# Patient Record
Sex: MALE | Race: WHITE | Employment: FULL TIME | URBAN - METROPOLITAN AREA
[De-identification: names, ages, dates, MRNs, and addresses within clinical notes are randomized per-mention and may not be internally consistent; named-entity substitution may affect disease eponyms.]

---

## 2017-06-23 ENCOUNTER — HOSPITAL ENCOUNTER (OUTPATIENT)
Dept: GENERAL RADIOLOGY | Age: 42
Discharge: HOME | End: 2017-06-23
Attending: INTERNAL MEDICINE
Payer: COMMERCIAL

## 2017-06-23 VITALS — BODY MASS INDEX: 33.64 KG/M2 | HEIGHT: 70 IN | WEIGHT: 235 LBS

## 2017-06-23 DIAGNOSIS — K63.3 ULCER OF INTESTINE: ICD-10-CM

## 2017-06-23 PROCEDURE — 74250 X-RAY XM SM INT 1CNTRST STD: CPT

## 2017-06-23 PROCEDURE — 74011000255 HC RX REV CODE- 255: Performed by: INTERNAL MEDICINE

## 2017-06-23 RX ADMIN — BARIUM SULFATE 1200 ML: 240 SUSPENSION ORAL at 11:22

## 2021-04-14 ENCOUNTER — HOSPITAL ENCOUNTER (OUTPATIENT)
Dept: LAB | Age: 46
Discharge: HOME | End: 2021-04-14

## 2021-04-14 PROCEDURE — 88305 TISSUE EXAM BY PATHOLOGIST: CPT

## 2022-11-17 RX ORDER — OMEPRAZOLE 40 MG/1
40 CAPSULE, DELAYED RELEASE ORAL DAILY
COMMUNITY

## 2022-11-17 RX ORDER — SUCRALFATE 1 G/1
3 TABLET ORAL DAILY
COMMUNITY

## 2022-11-17 RX ORDER — CETIRIZINE HYDROCHLORIDE 10 MG/1
10 TABLET ORAL DAILY
COMMUNITY

## 2022-11-17 RX ORDER — VENLAFAXINE HYDROCHLORIDE 150 MG/1
150 CAPSULE, EXTENDED RELEASE ORAL NIGHTLY
COMMUNITY

## 2022-11-17 NOTE — PERIOP NOTE
Patient verified name and . Order for consent NOT found in EHR; patient verifies procedure. Type 1B surgery, phone assessment complete. Orders NOT received. Labs per surgeon: unknown; no orders received in EHR at time of assessment. Labs per anesthesia protocol: none. Patient answered medical/surgical history questions at their best of ability. All prior to admission medications documented in [facility]. Patient instructed to take the following medications the day of surgery according to anesthesia guidelines with a small sip of water: omeprazole, carafate, zyrtec. On the day before surgery please take Acetaminophen 1000mg in the morning and then again before bed. You may substitute for Tylenol 650 mg. Hold all vitamins 7 days prior to surgery and NSAIDS 5 days prior to surgery. Prescription meds to hold: none. Patient instructed on the following:    > Arrive at A Entrance, time of arrival to be called the day before by 1700  > NPO after midnight, unless otherwise indicated, including gum, mints, and ice chips  > Responsible adult must drive patient to the hospital, stay during surgery, and patient will need supervision 24 hours after anesthesia  > Use dial antibacterial soap in shower the night before surgery and on the morning of surgery  > All piercings must be removed prior to arrival.    > Leave all valuables (money and jewelry) at home but bring insurance card and ID on DOS.   > You may be required to pay a deductible or co-pay on the day of your procedure. You can pre-pay by calling [phone] if your surgery is at the [facility] or [phone] if your surgery is at the [facility]. > Do not wear make-up, nail polish, lotions, cologne, perfumes, powders, or oil on skin. Artificial nails are not permitted.

## 2022-11-27 ENCOUNTER — ANESTHESIA EVENT (OUTPATIENT)
Dept: SURGERY | Age: 47
End: 2022-11-27
Payer: COMMERCIAL

## 2022-11-28 ENCOUNTER — ANESTHESIA (OUTPATIENT)
Dept: SURGERY | Age: 47
End: 2022-11-28
Payer: COMMERCIAL

## 2022-11-28 ENCOUNTER — HOSPITAL ENCOUNTER (OUTPATIENT)
Age: 47
Setting detail: OUTPATIENT SURGERY
Discharge: HOME | End: 2022-11-28
Attending: OTOLARYNGOLOGY | Admitting: OTOLARYNGOLOGY
Payer: COMMERCIAL

## 2022-11-28 VITALS
SYSTOLIC BLOOD PRESSURE: 152 MMHG | HEIGHT: 70 IN | TEMPERATURE: 98.4 F | WEIGHT: 246 LBS | HEART RATE: 68 BPM | BODY MASS INDEX: 35.22 KG/M2 | DIASTOLIC BLOOD PRESSURE: 93 MMHG | RESPIRATION RATE: 14 BRPM | OXYGEN SATURATION: 98 %

## 2022-11-28 PROCEDURE — 3700000000 HC ANESTHESIA ATTENDED CARE: Performed by: OTOLARYNGOLOGY

## 2022-11-28 PROCEDURE — 7100000011 HC PHASE II RECOVERY - ADDTL 15 MIN: Performed by: OTOLARYNGOLOGY

## 2022-11-28 PROCEDURE — 6370000000 HC RX 637 (ALT 250 FOR IP): Performed by: ANESTHESIOLOGY

## 2022-11-28 PROCEDURE — 2500000003 HC RX 250 WO HCPCS: Performed by: NURSE ANESTHETIST, CERTIFIED REGISTERED

## 2022-11-28 PROCEDURE — 6360000002 HC RX W HCPCS: Performed by: NURSE ANESTHETIST, CERTIFIED REGISTERED

## 2022-11-28 PROCEDURE — 7100000010 HC PHASE II RECOVERY - FIRST 15 MIN: Performed by: OTOLARYNGOLOGY

## 2022-11-28 PROCEDURE — 2709999900 HC NON-CHARGEABLE SUPPLY: Performed by: OTOLARYNGOLOGY

## 2022-11-28 PROCEDURE — 6360000002 HC RX W HCPCS: Performed by: ANESTHESIOLOGY

## 2022-11-28 PROCEDURE — 6370000000 HC RX 637 (ALT 250 FOR IP): Performed by: OTOLARYNGOLOGY

## 2022-11-28 PROCEDURE — 2580000003 HC RX 258: Performed by: ANESTHESIOLOGY

## 2022-11-28 PROCEDURE — 7100000000 HC PACU RECOVERY - FIRST 15 MIN: Performed by: OTOLARYNGOLOGY

## 2022-11-28 PROCEDURE — 3600000004 HC SURGERY LEVEL 4 BASE: Performed by: OTOLARYNGOLOGY

## 2022-11-28 PROCEDURE — 3700000001 HC ADD 15 MINUTES (ANESTHESIA): Performed by: OTOLARYNGOLOGY

## 2022-11-28 PROCEDURE — 7100000001 HC PACU RECOVERY - ADDTL 15 MIN: Performed by: OTOLARYNGOLOGY

## 2022-11-28 PROCEDURE — 2500000003 HC RX 250 WO HCPCS: Performed by: OTOLARYNGOLOGY

## 2022-11-28 PROCEDURE — 3600000014 HC SURGERY LEVEL 4 ADDTL 15MIN: Performed by: OTOLARYNGOLOGY

## 2022-11-28 RX ORDER — OXYMETAZOLINE HYDROCHLORIDE 0.05 G/100ML
SPRAY NASAL PRN
Status: DISCONTINUED | OUTPATIENT
Start: 2022-11-28 | End: 2022-11-28 | Stop reason: HOSPADM

## 2022-11-28 RX ORDER — MIDAZOLAM HYDROCHLORIDE 2 MG/2ML
2 INJECTION, SOLUTION INTRAMUSCULAR; INTRAVENOUS
Status: COMPLETED | OUTPATIENT
Start: 2022-11-28 | End: 2022-11-28

## 2022-11-28 RX ORDER — ONDANSETRON 2 MG/ML
INJECTION INTRAMUSCULAR; INTRAVENOUS PRN
Status: DISCONTINUED | OUTPATIENT
Start: 2022-11-28 | End: 2022-11-28 | Stop reason: SDUPTHER

## 2022-11-28 RX ORDER — LIDOCAINE HYDROCHLORIDE 20 MG/ML
INJECTION, SOLUTION EPIDURAL; INFILTRATION; INTRACAUDAL; PERINEURAL PRN
Status: DISCONTINUED | OUTPATIENT
Start: 2022-11-28 | End: 2022-11-28 | Stop reason: SDUPTHER

## 2022-11-28 RX ORDER — ONDANSETRON 2 MG/ML
4 INJECTION INTRAMUSCULAR; INTRAVENOUS
Status: DISCONTINUED | OUTPATIENT
Start: 2022-11-28 | End: 2022-11-28 | Stop reason: HOSPADM

## 2022-11-28 RX ORDER — DEXAMETHASONE SODIUM PHOSPHATE 10 MG/ML
INJECTION INTRAMUSCULAR; INTRAVENOUS PRN
Status: DISCONTINUED | OUTPATIENT
Start: 2022-11-28 | End: 2022-11-28 | Stop reason: SDUPTHER

## 2022-11-28 RX ORDER — OXYCODONE HYDROCHLORIDE 5 MG/1
10 TABLET ORAL PRN
Status: COMPLETED | OUTPATIENT
Start: 2022-11-28 | End: 2022-11-28

## 2022-11-28 RX ORDER — SODIUM CHLORIDE 0.9 % (FLUSH) 0.9 %
5-40 SYRINGE (ML) INJECTION EVERY 12 HOURS SCHEDULED
Status: DISCONTINUED | OUTPATIENT
Start: 2022-11-28 | End: 2022-11-28 | Stop reason: HOSPADM

## 2022-11-28 RX ORDER — LIDOCAINE HYDROCHLORIDE 10 MG/ML
1 INJECTION, SOLUTION INFILTRATION; PERINEURAL
Status: DISCONTINUED | OUTPATIENT
Start: 2022-11-28 | End: 2022-11-28 | Stop reason: HOSPADM

## 2022-11-28 RX ORDER — LIDOCAINE HYDROCHLORIDE AND EPINEPHRINE 10; 10 MG/ML; UG/ML
INJECTION, SOLUTION INFILTRATION; PERINEURAL PRN
Status: DISCONTINUED | OUTPATIENT
Start: 2022-11-28 | End: 2022-11-28 | Stop reason: HOSPADM

## 2022-11-28 RX ORDER — PROCHLORPERAZINE EDISYLATE 5 MG/ML
5 INJECTION INTRAMUSCULAR; INTRAVENOUS
Status: DISCONTINUED | OUTPATIENT
Start: 2022-11-28 | End: 2022-11-28 | Stop reason: HOSPADM

## 2022-11-28 RX ORDER — HYDROMORPHONE HYDROCHLORIDE 1 MG/ML
0.25 INJECTION, SOLUTION INTRAMUSCULAR; INTRAVENOUS; SUBCUTANEOUS EVERY 5 MIN PRN
Status: DISCONTINUED | OUTPATIENT
Start: 2022-11-28 | End: 2022-11-28 | Stop reason: HOSPADM

## 2022-11-28 RX ORDER — ROCURONIUM BROMIDE 10 MG/ML
INJECTION, SOLUTION INTRAVENOUS PRN
Status: DISCONTINUED | OUTPATIENT
Start: 2022-11-28 | End: 2022-11-28 | Stop reason: SDUPTHER

## 2022-11-28 RX ORDER — HYDROMORPHONE HYDROCHLORIDE 1 MG/ML
0.5 INJECTION, SOLUTION INTRAMUSCULAR; INTRAVENOUS; SUBCUTANEOUS EVERY 5 MIN PRN
Status: DISCONTINUED | OUTPATIENT
Start: 2022-11-28 | End: 2022-11-28 | Stop reason: HOSPADM

## 2022-11-28 RX ORDER — SODIUM CHLORIDE 9 MG/ML
INJECTION, SOLUTION INTRAVENOUS PRN
Status: DISCONTINUED | OUTPATIENT
Start: 2022-11-28 | End: 2022-11-28 | Stop reason: HOSPADM

## 2022-11-28 RX ORDER — ACETAMINOPHEN 500 MG
1000 TABLET ORAL ONCE
Status: COMPLETED | OUTPATIENT
Start: 2022-11-28 | End: 2022-11-28

## 2022-11-28 RX ORDER — SODIUM CHLORIDE 0.9 % (FLUSH) 0.9 %
5-40 SYRINGE (ML) INJECTION PRN
Status: DISCONTINUED | OUTPATIENT
Start: 2022-11-28 | End: 2022-11-28 | Stop reason: HOSPADM

## 2022-11-28 RX ORDER — DIPHENHYDRAMINE HYDROCHLORIDE 50 MG/ML
12.5 INJECTION INTRAMUSCULAR; INTRAVENOUS
Status: DISCONTINUED | OUTPATIENT
Start: 2022-11-28 | End: 2022-11-28 | Stop reason: HOSPADM

## 2022-11-28 RX ORDER — FENTANYL CITRATE 50 UG/ML
INJECTION, SOLUTION INTRAMUSCULAR; INTRAVENOUS PRN
Status: DISCONTINUED | OUTPATIENT
Start: 2022-11-28 | End: 2022-11-28 | Stop reason: SDUPTHER

## 2022-11-28 RX ORDER — PROPOFOL 10 MG/ML
INJECTION, EMULSION INTRAVENOUS PRN
Status: DISCONTINUED | OUTPATIENT
Start: 2022-11-28 | End: 2022-11-28 | Stop reason: SDUPTHER

## 2022-11-28 RX ORDER — OXYCODONE HYDROCHLORIDE 5 MG/1
5 TABLET ORAL PRN
Status: COMPLETED | OUTPATIENT
Start: 2022-11-28 | End: 2022-11-28

## 2022-11-28 RX ORDER — SUCCINYLCHOLINE CHLORIDE 20 MG/ML
INJECTION INTRAMUSCULAR; INTRAVENOUS PRN
Status: DISCONTINUED | OUTPATIENT
Start: 2022-11-28 | End: 2022-11-28 | Stop reason: SDUPTHER

## 2022-11-28 RX ORDER — SODIUM CHLORIDE, SODIUM LACTATE, POTASSIUM CHLORIDE, CALCIUM CHLORIDE 600; 310; 30; 20 MG/100ML; MG/100ML; MG/100ML; MG/100ML
INJECTION, SOLUTION INTRAVENOUS CONTINUOUS
Status: DISCONTINUED | OUTPATIENT
Start: 2022-11-28 | End: 2022-11-28 | Stop reason: HOSPADM

## 2022-11-28 RX ADMIN — OXYCODONE 10 MG: 5 TABLET ORAL at 16:33

## 2022-11-28 RX ADMIN — PROPOFOL 200 MG: 10 INJECTION, EMULSION INTRAVENOUS at 15:06

## 2022-11-28 RX ADMIN — FENTANYL CITRATE 25 MCG: 50 INJECTION, SOLUTION INTRAMUSCULAR; INTRAVENOUS at 15:23

## 2022-11-28 RX ADMIN — PROPOFOL 30 MG: 10 INJECTION, EMULSION INTRAVENOUS at 15:31

## 2022-11-28 RX ADMIN — FENTANYL CITRATE 50 MCG: 50 INJECTION, SOLUTION INTRAMUSCULAR; INTRAVENOUS at 15:06

## 2022-11-28 RX ADMIN — Medication 140 MG: at 15:06

## 2022-11-28 RX ADMIN — LIDOCAINE HYDROCHLORIDE 100 MG: 20 INJECTION, SOLUTION EPIDURAL; INFILTRATION; INTRACAUDAL; PERINEURAL at 15:06

## 2022-11-28 RX ADMIN — DEXAMETHASONE SODIUM PHOSPHATE 10 MG: 10 INJECTION INTRAMUSCULAR; INTRAVENOUS at 15:14

## 2022-11-28 RX ADMIN — ROCURONIUM BROMIDE 10 MG: 50 INJECTION, SOLUTION INTRAVENOUS at 15:06

## 2022-11-28 RX ADMIN — PROPOFOL 100 MG: 10 INJECTION, EMULSION INTRAVENOUS at 15:08

## 2022-11-28 RX ADMIN — MIDAZOLAM 2 MG: 1 INJECTION INTRAMUSCULAR; INTRAVENOUS at 13:57

## 2022-11-28 RX ADMIN — ONDANSETRON 4 MG: 2 INJECTION INTRAMUSCULAR; INTRAVENOUS at 15:14

## 2022-11-28 RX ADMIN — FENTANYL CITRATE 50 MCG: 50 INJECTION, SOLUTION INTRAMUSCULAR; INTRAVENOUS at 15:19

## 2022-11-28 RX ADMIN — SODIUM CHLORIDE, SODIUM LACTATE, POTASSIUM CHLORIDE, AND CALCIUM CHLORIDE 125 ML/HR: 600; 310; 30; 20 INJECTION, SOLUTION INTRAVENOUS at 13:41

## 2022-11-28 RX ADMIN — SODIUM CHLORIDE, SODIUM LACTATE, POTASSIUM CHLORIDE, AND CALCIUM CHLORIDE: 600; 310; 30; 20 INJECTION, SOLUTION INTRAVENOUS at 15:00

## 2022-11-28 RX ADMIN — ACETAMINOPHEN 1000 MG: 500 TABLET, FILM COATED ORAL at 13:42

## 2022-11-28 RX ADMIN — FENTANYL CITRATE 25 MCG: 50 INJECTION, SOLUTION INTRAMUSCULAR; INTRAVENOUS at 15:22

## 2022-11-28 RX ADMIN — FENTANYL CITRATE 50 MCG: 50 INJECTION, SOLUTION INTRAMUSCULAR; INTRAVENOUS at 15:14

## 2022-11-28 ASSESSMENT — PAIN SCALES - GENERAL
PAINLEVEL_OUTOF10: 0

## 2022-11-28 ASSESSMENT — PAIN - FUNCTIONAL ASSESSMENT: PAIN_FUNCTIONAL_ASSESSMENT: 0-10

## 2022-11-28 NOTE — DISCHARGE INSTRUCTIONS
Things to Remember After Surgery    1. Sleep and rest with head elevated on several pillows to decrease swelling and pressure. 2. You will have a 2 X 2 gauze under your nose to absorb the bloody discharge you will have the first 2 to 3 days after surgery. If you saturate the gauze more than 3 times in 30 minutes, please notify the office. 3. Numbness to the front teeth after nasal surgery is normal. The feeling usually returns in 6 to 8 weeks. 4. Clean the end of your nose with hydrogen peroxide. Do not try to clean inside. It may cause bleeding. 5. Do not blow your nose until you have had your first post-op appointment ( at least one week after surgery). 6. For a mild pain and a low-grade temperature, you may take Tylenol. No aspirin, Motrin, Advil, or Aleve for at least 3 weeks after nasal surgery. 7. If the nasal surgery requires an exterior cast and the cast falls off, please notify the office during office hours. 8. Nasal congestion after surgery is normal and will resolve after the splints and/or packing are removed. ACTIVITY   Bathe or shower as directed by your doctor. Avoid strenuous work and becoming overheated. Activity as directed by your doctor   Avoid bending over. DIET   Clear, cool liquids the first day; then soft diet the second day   Advance to regular diet on third day, unless your doctor orders otherwise. No milk products or foods with red color dyes   If nausea and vomiting continues, call your doctor. PAIN   Take pain medication as directed by your doctor. Call your doctor if pain is NOT relieved by medication. DO NOT take aspirin or blood thinners until directed by your doctor. FOLLOW-UP PHONE [address] Matt will be made by nursing staff   If you have any problems, call your doctor as needed. CALL YOUR DOCTOR IF   Bleeding is expected the first few days and should gradually clear.    Temperature of 10 1°F or above   Green or yellow discharge from nose   Stiff neck, changes in vision or mental status, confusion, or excessive drowsiness     Instructions Following Ambulatory Surgery    After Anesthesia   For the first 24 hours: DO NOT Drive, Drink alcoholic beverages, or Make important decisions   Be aware of dizziness following anesthesia and while taking pain medication

## 2022-11-28 NOTE — ANESTHESIA POSTPROCEDURE EVALUATION
Department of Anesthesiology  Postprocedure Note    Patient: Bernadette Santiago  MRN: [id number]  YOB: 1975  Date of evaluation: 11/28/2022      Procedure Summary     Date: 11/28/22 [place] [facility] [place] [facility] [place]    Anesthesia Start: 1500 Anesthesia Stop: 9036    Procedures:       SEPTOPLASTY (Nose)      BILATERAL SMRITS (Bilateral: Nose) Diagnosis:       Deviated nasal septum      Hypertrophy of nasal turbinates      (Deviated nasal septum [J34.2])      (Hypertrophy of nasal turbinates [J34.3])    Surgeons: Kody Rob DO Responsible Provider: Mark Arteaga DO    Anesthesia Type: general ASA Status: 3          Anesthesia Type: No value filed.     Selam Phase I:      Selam Phase II:        Anesthesia Post Evaluation    Patient location during evaluation: PACU  Level of consciousness: awake and alert  Airway patency: patent  Nausea & Vomiting: no nausea  Complications: no  Cardiovascular status: hemodynamically stable  Respiratory status: acceptable  Hydration status: euvolemic

## 2022-11-28 NOTE — ANESTHESIA PROCEDURE NOTES
Airway  Date/Time: 11/28/2022 3:20 PM  Urgency: elective    Airway not difficult    General Information and Staff    Patient location during procedure: OR  Anesthesiologist: Adriana Perez DO  Resident/CRNA: LATRICE Smith - CRNA    Indications and Patient Condition  Indications for airway management: anesthesia  Spontaneous Ventilation: absent  Sedation level: deep  Preoxygenated: yes  Patient position: sniffing  Mask difficulty assessment: vent by bag mask    Final Airway Details  Final airway type: endotracheal airway      Successful airway: ETT     Successful intubation technique: direct laryngoscopy  Endotracheal tube insertion site: oral  Blade: Janelle  Blade size: #3  ETT size (mm): 8.0  Cormack-Lehane Classification: grade IIa - partial view of glottis  Placement verified by: chest auscultation and capnometry   Measured from: lips  ETT to lips (cm): 24  Number of attempts at approach: 1

## 2022-11-28 NOTE — ANESTHESIA PRE PROCEDURE
Department of Anesthesiology  Preprocedure Note       Name:  Brandi Dennis   Age:  56 y.o.  :  1975                                          MRN:  [id number]         Date:  2022      Surgeon: Beckie Ortez):  Joanna Hutchins DO    Procedure: Procedure(s):  SEPTOPLASTY  BILATERAL SMRITS    Medications prior to admission:   Prior to Admission medications    Medication Sig Start Date End Date Taking? Authorizing Provider   venlafaxine (EFFEXOR XR) 150 MG extended release capsule Take 150 mg by mouth at bedtime   Yes Historical Provider, MD   cetirizine (ZYRTEC) 10 MG tablet Take 10 mg by mouth daily   Yes Historical Provider, MD   omeprazole (PRILOSEC) 40 MG delayed release capsule Take 40 mg by mouth daily   Yes Historical Provider, MD   sucralfate (CARAFATE) 1 GM tablet Take 3 g by mouth daily   Yes Historical Provider, MD       Current medications:    Current Facility-Administered Medications   Medication Dose Route Frequency Provider Last Rate Last Admin    lidocaine 1 % injection 1 mL  1 mL IntraDERmal Once PRN Missy Draper IV, MD        lactated ringers infusion   IntraVENous Continuous Missy Draper IV,  mL/hr at 22 1341 125 mL/hr at 22 1341    sodium chloride flush 0.9 % injection 5-40 mL  5-40 mL IntraVENous 2 times per day Missy Draper IV, MD        sodium chloride flush 0.9 % injection 5-40 mL  5-40 mL IntraVENous PRN Missy Draper IV, MD        0.9 % sodium chloride infusion   IntraVENous PRN Missy Draper IV, MD        midazolam PF (VERSED) injection 2 mg  2 mg IntraVENous Once PRN Missy Draper IV, MD           Allergies: Allergies   Allergen Reactions    Codeine Anaphylaxis       Problem List:  There is no problem list on file for this patient.       Past Medical History:        Diagnosis Date    BMI 33.0-33.9,adult     Depression     Fatty liver     Intestinal ulcer     ELIAS on CPAP        Past Surgical History:        Procedure Laterality Date    COLONOSCOPY      OTOPLASTY Bilateral     UMBILICAL HERNIA REPAIR         Social History:    Social History     Tobacco Use    Smoking status: Never    Smokeless tobacco: Never   Substance Use Topics    Alcohol use: No                                Counseling given: Not Answered      Vital Signs (Current):   Vitals:    11/17/22 1348 11/28/22 1300 11/28/22 1321   BP:  (!) 156/92    Pulse:  84    Resp:  17    Temp:  97.8 °F (36.6 °C)    TempSrc:  Temporal    SpO2:  98%    Weight: 230 lb (104.3 kg) 246 lb (111.6 kg)    Height: 5' 10\" (1.778 m)  5' 10\" (1.778 m)                                              BP Readings from Last 3 Encounters:   11/28/22 (!) 156/92       NPO Status: Time of last liquid consumption: 0745                        Time of last solid consumption: 2200                        Date of last liquid consumption: 11/28/22                        Date of last solid food consumption: 11/27/22    BMI:   Wt Readings from Last 3 Encounters:   11/28/22 246 lb (111.6 kg)     Body mass index is 35.3 kg/m². CBC: No results found for: WBC, RBC, HGB, HCT, MCV, RDW, PLT    CMP: No results found for: NA, K, CL, CO2, BUN, CREATININE, GFRAA, AGRATIO, LABGLOM, GLUCOSE, GLU, PROT, CALCIUM, BILITOT, ALKPHOS, AST, ALT    POC Tests: No results for input(s): POCGLU, POCNA, POCK, POCCL, POCBUN, POCHEMO, POCHCT in the last 72 hours.     Coags: No results found for: PROTIME, INR, APTT    HCG (If Applicable): No results found for: PREGTESTUR, PREGSERUM, HCG, HCGQUANT     ABGs: No results found for: PHART, PO2ART, LPN5CRE, BOO8PIU, BEART, E4SFEGTT     Type & Screen (If Applicable):  No results found for: LABABO, LABRH    Drug/Infectious Status (If Applicable):  No results found for: HIV, HEPCAB    COVID-19 Screening (If Applicable): No results found for: COVID19        Anesthesia Evaluation  Patient summary reviewed and Nursing notes reviewed  Airway: Mallampati: II          Dental: normal exam         Pulmonary:normal exam  breath sounds clear to auscultation  (+) sleep apnea: on noncompliant,                             Cardiovascular:Negative CV ROS  Exercise tolerance: good (>4 METS),           Rhythm: regular  Rate: normal                    Neuro/Psych:   (+) psychiatric history: stable with treatment            GI/Hepatic/Renal:   (+) PUD, liver disease:,           Endo/Other: Negative Endo/Other ROS                    Abdominal:             Vascular: Other Findings:           Anesthesia Plan      general     ASA 3       Induction: intravenous. MIPS: Postoperative opioids intended and Prophylactic antiemetics administered. Anesthetic plan and risks discussed with patient.                         Hans Escamilla DO   11/28/2022

## 2022-11-29 NOTE — OP NOTE
[place]  OPERATIVE REPORT    Name:  Bridger Langston  MR#:  [id number]  :  1975  ACCOUNT #:  [de-identified]  DATE OF SERVICE:  2022    PREOPERATIVE DIAGNOSES:  Deviated nasal septum, inferior turbinate hypertrophy and nasal obstruction. POSTOPERATIVE DIAGNOSES:  Deviated nasal septum, inferior turbinate hypertrophy and nasal obstruction. PROCEDURE PERFORMED:  Septoplasty and bilateral submucosal resection of the inferior turbinates. SURGEON:  Chemo Guzman. DO Cal    ASSISTANT:  None. ANESTHESIA:  General.    COMPLICATIONS:  None. SPECIMENS REMOVED:  None. IMPLANTS:  Bilateral Isbell splints. ESTIMATED BLOOD LOSS:  100 mL. HISTORY:  This is a 52-year-old male who came to see me in the office. This all started for him in 2021 with a sinus infection. He was on antibiotics, it did not clear it up and it still feels like even to this point that it has not gone away. He has been on multiple rounds of antibiotics. He does have seasonal allergies. He has maxillary sinus pressure, occasional frontal pressure. He is getting postnasal drip. He is getting a lot of nasal congestion and obstruction with mouth breathing. He has been on Zyrtec, Flonase, a nasal rinse. He has tried to wear a mask that has tried to keep any allergens out, which he has been unsuccessful with that too. So, given the facial pain and pressure, he was sent for a CT scan. CT scan showed a deviated septum to the right, turbinate hypertrophy, right maxillary sinus mucosal thickening, minimal mucosal thickening on the left, mild bilateral ethmoid mucosal thickening. He has a severely deviated nasal septum to the left. He has severe inferior turbinate hypertrophy bilaterally. So, based on the history and physical exam, it was my recommendation that he undergo a septoplasty and turbinate reduction. Procedure, risks and benefits were discussed with him in the office.   All questions were answered and he was agreeable to the surgery. SURGERY DETAILS:  The patient was identified in the preoperative waiting area, taken back to the operating room where he underwent general anesthesia. Approximately 3 mL of 1% lidocaine with epinephrine were injected into the inferior turbinates and septum bilaterally. Afrin-soaked pledgets were placed in each side of the nose and left there for a few minutes. These were then removed. A right-sided hemitransfixion incision was made in the anterior septum. A left-sided submucoperichondrial and submucoperiosteal flap were then raised. I came back to the bony cartilaginous junction, broke through that, raised a right-sided submucoperiosteal flap. He had a prominent maxillary crest going towards the left. He had a bony septal deviation superiorly to the left. He had more of a cartilaginous deviation towards the right. So, using a combination of a Paulino elevator, Sagar forceps, open [place] and hammer and chisel, I was able to isolate and remove the deviated portions of the nasal septum. A 4-0 chromic was used to reapproximate the septal flaps back in the midline position and this was also used to close the hemitransfixion incision. A knife was used to make a small stab incision in the anterior portion of the inferior turbinates. Pamalee Folk was used to create a small pocket between the bone of the inferior turbinate and the mucosa medially, and then a microdebrider with a turbinate blade was used to reduce the prominent bone and tissue of the inferior turbinates bilaterally. Boies elevator was used to medialize and lateralize the inferior turbinates and this gave the patient a widely patent nasal airway. Some blood was suctioned from the nose and nasopharynx, and Isbell splints with antibiotic ointment on them were placed in each side of the nose and sewn in place anteriorly using a single nylon stitch.   The patient was awakened and taken to the postop recovery room in a stable condition.         Jakob Portillo DO      [id number]  D:  11/28/2022 15:47  T:  11/29/2022 2:17  JOB #:  [id number]

## (undated) DEVICE — SUTURE NONABSORBABLE MONOFILAMENT 3-0 PS-1 18 IN BLK ETHILON 1663H

## (undated) DEVICE — CANISTER, RIGID, 2000CC: Brand: MEDLINE INDUSTRIES, INC.

## (undated) DEVICE — SPLINT NSL SEPTAL SUPP REG PRE PUNCHED HOLE SIL STRL BRTH EZ

## (undated) DEVICE — CODMAN® SURGICAL PATTIES 1" X 3" (2.54CM X 7.62CM): Brand: CODMAN®

## (undated) DEVICE — SOLUTION IRRIG 1000ML 0.9% SOD CHL USP POUR PLAS BTL

## (undated) DEVICE — GLOVE ORANGE PI 8 1/2   MSG9085

## (undated) DEVICE — KIT PROCEDURE SURG HEAD AND NECK TOTE

## (undated) DEVICE — SUTURE CHROMIC GUT SZ 4-0 L27IN ABSRB BRN L17MM RB-1 1/2 U203H

## (undated) DEVICE — TUBING, SUCTION, 1/4" X 10', STRAIGHT: Brand: MEDLINE